# Patient Record
Sex: FEMALE | Race: BLACK OR AFRICAN AMERICAN | Employment: OTHER | URBAN - NONMETROPOLITAN AREA
[De-identification: names, ages, dates, MRNs, and addresses within clinical notes are randomized per-mention and may not be internally consistent; named-entity substitution may affect disease eponyms.]

---

## 2021-09-18 ENCOUNTER — HOSPITAL ENCOUNTER (EMERGENCY)
Age: 86
Discharge: HOME | End: 2021-09-18
Attending: EMERGENCY MEDICINE
Payer: MEDICARE

## 2021-09-18 VITALS
TEMPERATURE: 98.4 F | WEIGHT: 175 LBS | RESPIRATION RATE: 20 BRPM | BODY MASS INDEX: 32.2 KG/M2 | OXYGEN SATURATION: 98 % | HEART RATE: 62 BPM | HEIGHT: 62 IN | SYSTOLIC BLOOD PRESSURE: 159 MMHG | DIASTOLIC BLOOD PRESSURE: 66 MMHG

## 2021-09-18 DIAGNOSIS — M71.22 BAKER'S CYST OF KNEE, LEFT: Primary | ICD-10-CM

## 2021-09-18 DIAGNOSIS — M71.21 BAKER'S CYST OF KNEE, RIGHT: ICD-10-CM

## 2021-09-18 LAB
APPEARANCE UR: CLEAR
BILIRUB UR QL: NEGATIVE
COLOR UR: NORMAL
GLUCOSE UR STRIP.AUTO-MCNC: NEGATIVE MG/DL
HGB UR QL STRIP: NEGATIVE
KETONES UR QL STRIP.AUTO: NEGATIVE MG/DL
LEUKOCYTE ESTERASE UR QL STRIP.AUTO: NEGATIVE
NITRITE UR QL STRIP.AUTO: NEGATIVE
PH UR STRIP: 5 [PH] (ref 5–8)
PROT UR STRIP-MCNC: NEGATIVE MG/DL
SP GR UR REFRACTOMETRY: 1 (ref 1–1.03)
UROBILINOGEN UR QL STRIP.AUTO: 0.2 EU/DL (ref 0.2–1)

## 2021-09-18 PROCEDURE — 74011250636 HC RX REV CODE- 250/636: Performed by: EMERGENCY MEDICINE

## 2021-09-18 PROCEDURE — 81003 URINALYSIS AUTO W/O SCOPE: CPT

## 2021-09-18 PROCEDURE — 99283 EMERGENCY DEPT VISIT LOW MDM: CPT

## 2021-09-18 PROCEDURE — 74011250637 HC RX REV CODE- 250/637: Performed by: EMERGENCY MEDICINE

## 2021-09-18 RX ORDER — GLYBURIDE 5 MG/1
5 TABLET ORAL
COMMUNITY

## 2021-09-18 RX ORDER — IBUPROFEN 600 MG/1
600 TABLET ORAL
Status: COMPLETED | OUTPATIENT
Start: 2021-09-18 | End: 2021-09-18

## 2021-09-18 RX ORDER — MECLIZINE HYDROCHLORIDE 25 MG/1
25 TABLET ORAL
Qty: 30 TABLET | Refills: 0 | Status: SHIPPED | OUTPATIENT
Start: 2021-09-18 | End: 2021-09-28

## 2021-09-18 RX ORDER — NITROGLYCERIN 400 UG/1
1 SPRAY ORAL
COMMUNITY

## 2021-09-18 RX ORDER — ISOSORBIDE MONONITRATE 30 MG/1
60 TABLET, EXTENDED RELEASE ORAL DAILY
COMMUNITY

## 2021-09-18 RX ORDER — HYDRALAZINE HYDROCHLORIDE 25 MG/1
25 TABLET, FILM COATED ORAL 2 TIMES DAILY
COMMUNITY

## 2021-09-18 RX ORDER — CLOPIDOGREL BISULFATE 75 MG/1
75 TABLET ORAL DAILY
COMMUNITY

## 2021-09-18 RX ORDER — BISOPROLOL FUMARATE AND HYDROCHLOROTHIAZIDE 10; 6.25 MG/1; MG/1
1 TABLET ORAL DAILY
COMMUNITY

## 2021-09-18 RX ORDER — MECLIZINE HCL 12.5 MG 12.5 MG/1
50 TABLET ORAL ONCE
Status: COMPLETED | OUTPATIENT
Start: 2021-09-18 | End: 2021-09-18

## 2021-09-18 RX ORDER — MECLIZINE HCL 12.5 MG 12.5 MG/1
50 TABLET ORAL DAILY
Status: DISCONTINUED | OUTPATIENT
Start: 2021-09-19 | End: 2021-09-18

## 2021-09-18 RX ADMIN — MECLIZINE 50 MG: 12.5 TABLET ORAL at 16:35

## 2021-09-18 RX ADMIN — IBUPROFEN 600 MG: 600 TABLET ORAL at 17:19

## 2021-09-18 NOTE — ED TRIAGE NOTES
.  Chief Complaint   Patient presents with    Fall     Pt presents with complaint of fall yesterday stating that she passed out before falling after getting hot and flushed feeling. Pt A&Ox4 at this time, pt has pmh of diabetes and HTN. Pt complaining of right hip pain rating pain 5/10. Pt ambulatory to triage with assistance of a cane.      Syncope
Normal for race

## 2021-09-18 NOTE — ED PROVIDER NOTES
EMERGENCY DEPARTMENT HISTORY AND PHYSICAL EXAM      Date: 9/18/2021  Patient Name: Denisse Wilkerson    History of Presenting Illness     Chief Complaint   Patient presents with    Fall     Pt presents with complaint of fall yesterday stating that she passed out before falling after getting hot and flushed feeling. Pt A&Ox4 at this time, pt has pmh of diabetes and HTN. Pt complaining of right hip pain rating pain 5/10. Pt ambulatory to triage with assistance of a cane.  Syncope       History Provided By: Patient    HPI: Denisse Wilkerson, 80 y.o. female with a past medical history significant No significant past medical history presents to the ED with cc of patient yesterday was standing in the kitchen cooking when she was leaving the kitchen she felt some lightheadedness and fell onto her buttocks onto the floor where she was complaining about bilateral hip pain to the floor today patient denies any such symptoms patient denies any dizziness today but complaining of pain behind her knees    There are no other complaints, changes, or physical findings at this time. PCP: No primary care provider on file. No current facility-administered medications on file prior to encounter. Current Outpatient Medications on File Prior to Encounter   Medication Sig Dispense Refill    bisoprolol-hydroCHLOROthiazide (ZIAC) 10-6.25 mg per tablet Take 1 Tablet by mouth daily.  clopidogreL (Plavix) 75 mg tab Take 75 mg by mouth daily.  glyBURIDE (DIABETA) 5 mg tablet Take 5 mg by mouth Daily (before breakfast).  SITagliptin-metFORMIN (JANUMET) 50-1,000 mg per tablet Take 1 Tablet by mouth daily (with breakfast).  hydrALAZINE (APRESOLINE) 25 mg tablet Take 25 mg by mouth two (2) times a day.  isosorbide mononitrate ER (IMDUR) 30 mg tablet Take 60 mg by mouth daily.  nitroglycerin (NITROLINGUAL) 400 mcg/spray spray 1 Spray by SubLINGual route every five (5) minutes as needed for Chest Pain. Past History     Past Medical History:  History reviewed. No pertinent past medical history. Past Surgical History:  History reviewed. No pertinent surgical history. Family History:  History reviewed. No pertinent family history. Social History:  Social History     Tobacco Use    Smoking status: Never Smoker    Smokeless tobacco: Never Used   Substance Use Topics    Alcohol use: Not on file    Drug use: Not on file       Allergies:  No Known Allergies      Review of Systems     Review of Systems   Constitutional: Negative for chills and fever. HENT: Negative for rhinorrhea and sore throat. Eyes: Negative for pain and visual disturbance. Respiratory: Negative for cough and shortness of breath. Cardiovascular: Negative for chest pain and leg swelling. Gastrointestinal: Negative for abdominal pain and vomiting. Endocrine: Negative for polydipsia and polyuria. Genitourinary: Negative for dysuria and urgency. Musculoskeletal: Negative for back pain and neck pain. Skin: Negative for color change and pallor. Neurological: Negative for weakness and numbness. Psychiatric/Behavioral: Negative. Physical Exam     Physical Exam  Vitals and nursing note reviewed. Constitutional:       General: She is not in acute distress. Appearance: Normal appearance. She is not ill-appearing or toxic-appearing. HENT:      Head: Normocephalic and atraumatic. Right Ear: Tympanic membrane and ear canal normal.      Left Ear: Tympanic membrane and ear canal normal.      Nose: Nose normal.      Mouth/Throat:      Mouth: Mucous membranes are dry. Eyes:      Extraocular Movements: Extraocular movements intact. Conjunctiva/sclera: Conjunctivae normal.      Pupils: Pupils are equal, round, and reactive to light. Cardiovascular:      Rate and Rhythm: Normal rate and regular rhythm. Pulses: Normal pulses. Heart sounds: Normal heart sounds.    Pulmonary:      Effort: Pulmonary effort is normal.      Breath sounds: Normal breath sounds. Abdominal:      General: Bowel sounds are normal.      Palpations: Abdomen is soft. Musculoskeletal:         General: No swelling, tenderness, deformity or signs of injury. Cervical back: Normal range of motion and neck supple. Right knee: No bony tenderness or crepitus. Left knee: No bony tenderness or crepitus. Legs:       Comments: No calf tenderness   Skin:     General: Skin is warm and dry. Neurological:      General: No focal deficit present. Mental Status: She is alert and oriented to person, place, and time. Cranial Nerves: No cranial nerve deficit. Sensory: No sensory deficit. Motor: No weakness. Coordination: Coordination normal.      Deep Tendon Reflexes: Reflexes normal.   Psychiatric:         Mood and Affect: Mood normal.         Behavior: Behavior normal.         Lab and Diagnostic Study Results     Labs -   No results found for this or any previous visit (from the past 12 hour(s)). Radiologic Studies -   @lastxrresult@  CT Results  (Last 48 hours)    None        CXR Results  (Last 48 hours)    None            Medical Decision Making   - I am the first provider for this patient. - I reviewed the vital signs, available nursing notes, past medical history, past surgical history, family history and social history. - Initial assessment performed. The patients presenting problems have been discussed, and they are in agreement with the care plan formulated and outlined with them. I have encouraged them to ask questions as they arise throughout their visit. Vital Signs-Reviewed the patient's vital signs.   Patient Vitals for the past 12 hrs:   Temp Pulse Resp BP SpO2   09/18/21 1544 98.4 °F (36.9 °C) 62 20 (!) 159/66 98 %       Records Reviewed: Nursing Notes    The patient presents with fall complaint of hip pain with a differential diagnosis of dislocation, closed fracture, contusion      ED Course:          Provider Notes (Medical Decision Making): MDM       Procedures   Medical Decision Makingedical Decision Making  Performed by: Shantanu Huston MD  PROCEDURES:  Procedures       Disposition   Disposition: Condition stable and improved  DC- Adult Discharges: All of the diagnostic tests were reviewed and questions answered. Diagnosis, care plan and treatment options were discussed. The patient understands the instructions and will follow up as directed. The patients results have been reviewed with them. They have been counseled regarding their diagnosis. The patient verbally convey understanding and agreement of the signs, symptoms, diagnosis, treatment and prognosis and additionally agrees to follow up as recommended with their PCP in 24 - 48 hours. They also agree with the care-plan and convey that all of their questions have been answered. I have also put together some discharge instructions for them that include: 1) educational information regarding their diagnosis, 2) how to care for their diagnosis at home, as well a 3) list of reasons why they would want to return to the ED prior to their follow-up appointment, should their condition change. DISCHARGE PLAN:  1. Current Discharge Medication List      CONTINUE these medications which have NOT CHANGED    Details   bisoprolol-hydroCHLOROthiazide (ZIAC) 10-6.25 mg per tablet Take 1 Tablet by mouth daily. clopidogreL (Plavix) 75 mg tab Take 75 mg by mouth daily. glyBURIDE (DIABETA) 5 mg tablet Take 5 mg by mouth Daily (before breakfast). SITagliptin-metFORMIN (JANUMET) 50-1,000 mg per tablet Take 1 Tablet by mouth daily (with breakfast). hydrALAZINE (APRESOLINE) 25 mg tablet Take 25 mg by mouth two (2) times a day. isosorbide mononitrate ER (IMDUR) 30 mg tablet Take 60 mg by mouth daily.       nitroglycerin (NITROLINGUAL) 400 mcg/spray spray 1 Spray by SubLINGual route every five (5) minutes as needed for Chest Pain. 2.   Follow-up Information    None       3. Return to ED if worse   4. Current Discharge Medication List            Diagnosis     Clinical Impression: No diagnosis found. Attestations:    Jignesh Barroso MD    Please note that this dictation was completed with [employer], the computer voice recognition software. Quite often unanticipated grammatical, syntax, homophones, and other interpretive errors are inadvertently transcribed by the computer software. Please disregard these errors. Please excuse any errors that have escaped final proofreading. Thank you.

## 2022-09-06 ENCOUNTER — OFFICE VISIT (OUTPATIENT)
Dept: INFECTIOUS DISEASES | Age: 87
End: 2022-09-06
Payer: MEDICARE

## 2022-09-06 VITALS
TEMPERATURE: 98.7 F | DIASTOLIC BLOOD PRESSURE: 80 MMHG | WEIGHT: 161.6 LBS | HEART RATE: 66 BPM | SYSTOLIC BLOOD PRESSURE: 181 MMHG | HEIGHT: 62 IN | BODY MASS INDEX: 29.74 KG/M2 | OXYGEN SATURATION: 97 %

## 2022-09-06 DIAGNOSIS — Z22.7 TB LUNG, LATENT: Primary | ICD-10-CM

## 2022-09-06 DIAGNOSIS — M19.90 INFLAMMATORY ARTHRITIS: ICD-10-CM

## 2022-09-06 DIAGNOSIS — M25.50 ARTHRALGIA, UNSPECIFIED JOINT: ICD-10-CM

## 2022-09-06 PROCEDURE — G8510 SCR DEP NEG, NO PLAN REQD: HCPCS | Performed by: INTERNAL MEDICINE

## 2022-09-06 PROCEDURE — 1090F PRES/ABSN URINE INCON ASSESS: CPT | Performed by: INTERNAL MEDICINE

## 2022-09-06 PROCEDURE — 1101F PT FALLS ASSESS-DOCD LE1/YR: CPT | Performed by: INTERNAL MEDICINE

## 2022-09-06 PROCEDURE — G8536 NO DOC ELDER MAL SCRN: HCPCS | Performed by: INTERNAL MEDICINE

## 2022-09-06 PROCEDURE — G8417 CALC BMI ABV UP PARAM F/U: HCPCS | Performed by: INTERNAL MEDICINE

## 2022-09-06 PROCEDURE — 99203 OFFICE O/P NEW LOW 30 MIN: CPT | Performed by: INTERNAL MEDICINE

## 2022-09-06 PROCEDURE — G8427 DOCREV CUR MEDS BY ELIG CLIN: HCPCS | Performed by: INTERNAL MEDICINE

## 2022-09-06 RX ORDER — PREDNISONE 5 MG/1
5 TABLET ORAL DAILY
COMMUNITY
Start: 2022-08-09

## 2022-09-06 RX ORDER — OMEPRAZOLE 20 MG/1
20 CAPSULE, DELAYED RELEASE ORAL DAILY
COMMUNITY
Start: 2022-07-31

## 2022-09-06 RX ORDER — MINERAL OIL
ENEMA (ML) RECTAL
COMMUNITY
Start: 2022-07-30

## 2022-09-06 RX ORDER — ZINC GLUCONATE 50 MG
50 TABLET ORAL DAILY
COMMUNITY

## 2022-09-06 NOTE — PROGRESS NOTES
Subjective  Dione Shaw    HPI: Pleasant  80 y.o BF presenting per referral by her Rheumatologist, Dr Piyush Banuelos, for a positive quantiferon TB Gold. Pt is accompanied by her daughter today who notes pt was diagnosed w latent TB 50 years ago for which she received a pill for several months through [facility]. Her daughter was a child at the time and doesn't recall the exact details but is able to tell me her now  father received the same pill. Pt states she last had a PD over 50 years ago but has never had a blood test for latent TB. She denies recent cough, night sweat, hemoptysis, unintentional weight loss, appetite changes, dyspnea or fever/chills. Recent CXR done by Dr Galileo Gunter office did not show any cavitary lesions or infiltrates concerning for TB reactivation. She is on Prednisone for her rheumatologic disease and reports symptomatic improvement. She denies acute complaints on assessment today. Review of Systems   Constitutional: Negative. Cardiovascular: Negative. Gastrointestinal: Negative. Genitourinary: Negative. Musculoskeletal:  Positive for joint pain. Negative for myalgias. Skin: Negative. Neurological: Negative. Past Medical History:   Diagnosis Date    Diabetes ([facility] [address]     Hypertension     Rheumatoid arthritis involving left shoulder (HCC)         No past surgical history on file. Social History     Tobacco Use    Smoking status: Never    Smokeless tobacco: Never        No family history on file. No Known Allergies     Objective  Physical Exam  Constitutional:       Appearance: Normal appearance. Cardiovascular:      Rate and Rhythm: Normal rate and regular rhythm. Pulmonary:      Effort: Pulmonary effort is normal.      Breath sounds: Normal breath sounds. Abdominal:      General: Abdomen is flat. Musculoskeletal:         General: No swelling or tenderness. Normal range of motion. Skin:     General: Skin is warm and dry.    Neurological:      General: No focal deficit present. Mental Status: She is alert and oriented to person, place, and time. Assessment & Plan  Diagnoses and all orders for this visit:    1. 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 Inflammatory arthritis: On Prednisone w symptomatic improvement     3.  Arthralgia, unspecified joint: Mgt per rheumatology

## 2024-04-25 ENCOUNTER — HOSPITAL ENCOUNTER (EMERGENCY)
Facility: HOSPITAL | Age: 89
Discharge: HOME | End: 2024-04-25
Attending: EMERGENCY MEDICINE
Payer: MEDICARE

## 2024-04-25 VITALS
DIASTOLIC BLOOD PRESSURE: 95 MMHG | OXYGEN SATURATION: 98 % | HEART RATE: 78 BPM | SYSTOLIC BLOOD PRESSURE: 191 MMHG | TEMPERATURE: 98 F | RESPIRATION RATE: 18 BRPM

## 2024-04-25 DIAGNOSIS — R04.0 EPISTAXIS: Primary | ICD-10-CM

## 2024-04-25 PROCEDURE — 6370000000 HC RX 637 (ALT 250 FOR IP): Performed by: EMERGENCY MEDICINE

## 2024-04-25 PROCEDURE — 99283 EMERGENCY DEPT VISIT LOW MDM: CPT

## 2024-04-25 RX ADMIN — SILVER NITRATE 1 EACH: 38.21; 12.74 STICK TOPICAL at 14:15

## 2024-04-25 ASSESSMENT — PAIN - FUNCTIONAL ASSESSMENT: PAIN_FUNCTIONAL_ASSESSMENT: NONE - DENIES PAIN

## 2024-04-25 NOTE — ED NOTES
Pt given discharge and follow up instructions at nurses station. Pt has no further questions at this time. Pt left with visitor.

## 2024-04-25 NOTE — ED TRIAGE NOTES
Reports nosebleed from right nare that started this am at 10am after coughing, takes Plavix for unknown reason last dose yesterday, has tissue in right nare on arrival to ER.

## 2024-04-27 NOTE — ED PROVIDER NOTES
[facility] EMERGENCY DEPT  EMERGENCY DEPARTMENT HISTORY AND PHYSICAL EXAM      Date: 4/25/2024  Patient Name: Allyson Heard  MRN: [id number]  Birthdate 1935  Date of evaluation: 4/25/2024  Provider: Jose Ford MD   Note Started: 11:01 PM EDT 4/26/24    HISTORY OF PRESENT ILLNESS     Chief Complaint   Patient presents with    Epistaxis       History Provided By: Patient    HPI: Allyson Heard is a 88 y.o. female arrives with nose bleed from right nares for the past hour. On plavix. States bleeding would not stop on its own so came in . No lightheadedness. No loc.     PAST MEDICAL HISTORY   Past Medical History:  Past Medical History:   Diagnosis Date    Diabetes (HCC)     Hypertension     Rheumatoid arthritis involving left shoulder (HCC)        Past Surgical History:  History reviewed. No pertinent surgical history.    Family History:  History reviewed. No pertinent family history.    Social History:  Social History     Tobacco Use    Smoking status: Never    Smokeless tobacco: Never       Allergies:  No Known Allergies    PCP: Hugh William MD    Current Meds:   No current facility-administered medications for this encounter.     Current Outpatient Medications   Medication Sig Dispense Refill    bisoprolol-hydroCHLOROthiazide (ZIAC) 10-6.25 MG per tablet Take 1 tablet by mouth daily      clopidogrel (PLAVIX) 75 MG tablet Take 1 tablet by mouth daily      fexofenadine (ALLEGRA) 180 MG tablet TAKE 1 TABLET (180 MG) BY MOUTH DAILY SWALLOW WHOLE WITH WATER DO NOT TAKE WITH FRUIT JUICES.      glyBURIDE (DIABETA) 5 MG tablet Take 1 tablet by mouth every morning (before breakfast)      hydrALAZINE (APRESOLINE) 25 MG tablet Take 1 tablet by mouth 2 times daily      isosorbide mononitrate (IMDUR) 30 MG extended release tablet Take 2 tablets by mouth daily      nitroGLYCERIN (NITROLINGUAL) 0.4 MG/SPRAY 0.4 mg spray Place 1 spray under the tongue      omeprazole (PRILOSEC) 20 MG delayed release capsule Take 1 capsule  and DIFFERENTIAL DIAGNOSIS/MDM   11:01 PM Differential and Considerations: Pt with bleeding from right nareas. Controlled on arrival note dot have oozing frmo small area, clamp placed, monitored for 30 minutes , still oozing slightly so Silver nitrate placed with good hemostasis. Follow up and return precautions discussed. She has not taken her PM bp medications but will upon leaving the ED.     Records Reviewed (source and summary of external notes): Prior medical records and Nursing notes.    Vitals:    Vitals:    04/25/24 1305   BP: (!) 191/95   Pulse: 78   Resp: 18   Temp: 98 °F (36.7 °C)   TempSrc: Oral   SpO2: 98%        ED COURSE          Patient was given the following medications:  Medications   silver nitrate applicators applicator 1 each (1 each Topical Given 4/25/24 1415)       CONSULTS: See ED Course/MDM for further details.  None        PROCEDURES   Unless otherwise noted above, none  Procedures      CRITICAL CARE TIME   Patient does not meet Critical Care Time, 0 minutes    ED IMPRESSION     1. Epistaxis          DISPOSITION/PLAN   DISPOSITION Decision To Discharge 04/25/2024 02:20:43 PM    Discharge Note: The patient is stable for discharge home. The signs, symptoms, diagnosis, and discharge instructions have been discussed, understanding conveyed, and agreed upon. The patient is to follow up as recommended or return to ER should their symptoms worsen.      PATIENT REFERRED TO:  Hugh William MD  [address] [place] [id number] [phone]    In 1 week  for followup    [facility] EMERGENCY DEPT  [address] [place] [id number] [phone]  Go to   As needed, If symptoms worsen.  Or for any concerns or deteriorations        DISCHARGE MEDICATIONS:     Medication List        ASK your doctor about these medications      bisoprolol-hydroCHLOROthiazide 10-6.25 MG per tablet  Commonly known as: ZIAC     clopidogrel 75 MG tablet  Commonly known as: PLAVIX     fexofenadine 180 MG tablet  Commonly